# Patient Record
Sex: MALE | Race: WHITE | NOT HISPANIC OR LATINO | URBAN - METROPOLITAN AREA
[De-identification: names, ages, dates, MRNs, and addresses within clinical notes are randomized per-mention and may not be internally consistent; named-entity substitution may affect disease eponyms.]

---

## 2017-02-23 ENCOUNTER — ON CAMPUS - OUTPATIENT (AMBULATORY)
Dept: URBAN - METROPOLITAN AREA HOSPITAL 2 | Facility: HOSPITAL | Age: 62
End: 2017-02-23
Payer: COMMERCIAL

## 2017-02-23 ENCOUNTER — OFFICE (AMBULATORY)
Dept: URBAN - METROPOLITAN AREA CLINIC 64 | Facility: CLINIC | Age: 62
End: 2017-02-23
Payer: COMMERCIAL

## 2017-02-23 VITALS
OXYGEN SATURATION: 99 % | SYSTOLIC BLOOD PRESSURE: 104 MMHG | RESPIRATION RATE: 16 BRPM | SYSTOLIC BLOOD PRESSURE: 102 MMHG | SYSTOLIC BLOOD PRESSURE: 94 MMHG | HEART RATE: 56 BPM | HEART RATE: 67 BPM | HEART RATE: 69 BPM | WEIGHT: 175 LBS | HEART RATE: 70 BPM | DIASTOLIC BLOOD PRESSURE: 68 MMHG | SYSTOLIC BLOOD PRESSURE: 82 MMHG | DIASTOLIC BLOOD PRESSURE: 78 MMHG | OXYGEN SATURATION: 97 % | SYSTOLIC BLOOD PRESSURE: 111 MMHG | SYSTOLIC BLOOD PRESSURE: 105 MMHG | DIASTOLIC BLOOD PRESSURE: 75 MMHG | SYSTOLIC BLOOD PRESSURE: 106 MMHG | SYSTOLIC BLOOD PRESSURE: 96 MMHG | SYSTOLIC BLOOD PRESSURE: 81 MMHG | SYSTOLIC BLOOD PRESSURE: 83 MMHG | OXYGEN SATURATION: 98 % | TEMPERATURE: 97.6 F | DIASTOLIC BLOOD PRESSURE: 54 MMHG | RESPIRATION RATE: 18 BRPM | HEIGHT: 74 IN | HEART RATE: 71 BPM | DIASTOLIC BLOOD PRESSURE: 62 MMHG | DIASTOLIC BLOOD PRESSURE: 61 MMHG | HEART RATE: 58 BPM | HEART RATE: 68 BPM | DIASTOLIC BLOOD PRESSURE: 57 MMHG | DIASTOLIC BLOOD PRESSURE: 56 MMHG

## 2017-02-23 DIAGNOSIS — K63.5 POLYP OF COLON: ICD-10-CM

## 2017-02-23 DIAGNOSIS — K64.0 FIRST DEGREE HEMORRHOIDS: ICD-10-CM

## 2017-02-23 DIAGNOSIS — R19.7 DIARRHEA, UNSPECIFIED: ICD-10-CM

## 2017-02-23 DIAGNOSIS — K52.831 COLLAGENOUS COLITIS: ICD-10-CM

## 2017-02-23 DIAGNOSIS — K52.89 OTHER SPECIFIED NONINFECTIVE GASTROENTERITIS AND COLITIS: ICD-10-CM

## 2017-02-23 PROBLEM — D12.5 BENIGN NEOPLASM OF SIGMOID COLON: Status: ACTIVE | Noted: 2017-02-23

## 2017-02-23 LAB
GI HISTOLOGY: A. UNSPECIFIED: (no result)
GI HISTOLOGY: B. UNSPECIFIED: (no result)
GI HISTOLOGY: C. UNSPECIFIED: (no result)
GI HISTOLOGY: PDF REPORT: (no result)

## 2017-02-23 PROCEDURE — 88305 TISSUE EXAM BY PATHOLOGIST: CPT | Performed by: INTERNAL MEDICINE

## 2017-02-23 PROCEDURE — 45380 COLONOSCOPY AND BIOPSY: CPT | Performed by: INTERNAL MEDICINE

## 2017-02-23 RX ORDER — COLESTIPOL HYDROCHLORIDE 1 G/1
3 TABLET, FILM COATED ORAL
Qty: 90 | Refills: 11 | Status: COMPLETED
Start: 2016-06-30 | End: 2017-07-03

## 2017-02-23 RX ADMIN — PROPOFOL: 10 INJECTION, EMULSION INTRAVENOUS at 08:12

## 2017-07-03 ENCOUNTER — OFFICE (AMBULATORY)
Dept: URBAN - METROPOLITAN AREA CLINIC 64 | Facility: CLINIC | Age: 62
End: 2017-07-03

## 2017-07-03 VITALS
HEART RATE: 65 BPM | WEIGHT: 186 LBS | SYSTOLIC BLOOD PRESSURE: 110 MMHG | DIASTOLIC BLOOD PRESSURE: 69 MMHG | HEIGHT: 74 IN

## 2017-07-03 DIAGNOSIS — K52.831 COLLAGENOUS COLITIS: ICD-10-CM

## 2017-07-03 DIAGNOSIS — R19.7 DIARRHEA, UNSPECIFIED: ICD-10-CM

## 2017-07-03 PROCEDURE — 99213 OFFICE O/P EST LOW 20 MIN: CPT | Performed by: INTERNAL MEDICINE

## 2017-07-03 RX ORDER — IBUPROFEN 200 MG/1
CAPSULE, LIQUID FILLED ORAL; ORAL
Status: COMPLETED
End: 2017-07-03

## 2023-03-30 NOTE — PROGRESS NOTES
Subjective     Chief Complaint   Patient presents with   • Neck Pain     Neck pain         Previous Treatment: Physical therapy, chiropractic care, Norco 5 PRN, advil, muscle relaxers    HPI: Randy Grigsby is a 67 y.o. male with a pertinent PMH of previous rotator cuff tear who presents today c/o chronic neck pain for >10 years.  Symptoms have progressively worsened over time.  His pain is constant and worse on the left side where it goes into his shoulder.  Left lateral ROM provokes his pain.  Nothing in particular makes the pain better.  Pain does not radiate down either arm.  He does endorse intermittent left hand numbness/tingling into the third fourth and fifth digits.  He denies balance/coordination issues, difficulty with fine motor tasks, frequently dropping things, or any significant degree of weakness in his extremities.  He does report intermittent headaches associated with his pain that are typically felt on the left side.  He has no visual disturbance or speech difficulties.  He denies history of facial pain/numbness/tingling.    Patient has tried multiple sessions of physical therapy throughout the past several years without any significant relief.  He is currently seeing a chiropractor which helps his low back but not his neck.  He utilizes Norco 5 and Advil on an occasional basis.  He has previously gotten significant relief with a steroid pack.  He has tried trigger point injections with transient relief.  He has never tried facet injections/ablation or epidural steroid injections.        PMH:  Past Medical History:   Diagnosis Date   • Arthritis    • Cervical spinal stenosis    • Colon polyps    • GERD (gastroesophageal reflux disease)    • History of fracture of left ankle fracture    • History of rotator cuff tear    • Persistent headaches    • Psoriasis    • Tinnitus          Current Outpatient Medications:   •  BUDESONIDE ER PO, Take  by mouth., Disp: , Rfl:   •  clotrimazole (LOTRIMIN) 1 %  "cream, Apply 1 application topically to the appropriate area as directed 2 (Two) Times a Day., Disp: , Rfl:   •  HYDROcodone-acetaminophen (NORCO) 5-325 MG per tablet, Take 1 tablet by mouth Every 6 (Six) Hours As Needed., Disp: , Rfl:   •  IBUPROFEN PO, Take  by mouth., Disp: , Rfl:   •  indomethacin (INDOCIN) 50 MG capsule, Take 1 capsule by mouth 3 (Three) Times a Day As Needed., Disp: , Rfl:   •  pantoprazole (PROTONIX) 40 MG pack packet, Take  by mouth Every Morning Before Breakfast., Disp: , Rfl:   •  methylPREDNISolone (MEDROL) 4 MG dose pack, Take as directed on package instructions., Disp: 1 each, Rfl: 0     Allergies   Allergen Reactions   • Anectine [Succinylcholine Chloride] Other (See Comments)     intolerance   • Sulfa Antibiotics Other (See Comments)     Was told as a child by a parent        History reviewed. No pertinent surgical history.     History reviewed. No pertinent family history.      Social Hx:  Social History     Tobacco Use   Smoking Status Some Days   • Types: Cigars   • Passive exposure: Never   Smokeless Tobacco Never      Alcohol Use: Not on file      Social History     Substance and Sexual Activity   Drug Use Never          Review of Systems   Constitutional: Positive for activity change.   HENT: Negative.    Eyes: Negative.    Respiratory: Negative.    Cardiovascular: Negative.    Gastrointestinal: Negative.    Endocrine: Negative.    Genitourinary: Negative.    Musculoskeletal: Positive for arthralgias, myalgias and neck pain.        Left shoulder   Skin: Negative.    Allergic/Immunologic: Negative.    Neurological: Positive for numbness (tingling/hand).   Hematological: Negative.    Psychiatric/Behavioral: Positive for sleep disturbance.         Objective     BP 97/63   Pulse 76   Ht 188 cm (74\")   Wt 83.2 kg (183 lb 6.4 oz)   SpO2 98%   BMI 23.55 kg/m²    Body mass index is 23.55 kg/m².      Physical Exam  Vitals reviewed.   Constitutional:       General: He is not in acute " distress.     Appearance: Normal appearance. He is well-developed, well-groomed and normal weight.   HENT:      Head: Normocephalic and atraumatic.   Eyes:      Extraocular Movements: Extraocular movements intact.      Pupils: Pupils are equal, round, and reactive to light.   Neck:      Comments: Negative Spurling sign  Negative Lhermitte's sign  Pain elicited with lateral ROM to the left, with associated grimacing  Cardiovascular:      Rate and Rhythm: Normal rate and regular rhythm.      Pulses: Normal pulses.   Pulmonary:      Effort: Pulmonary effort is normal. No respiratory distress.   Musculoskeletal:         General: No swelling or tenderness. Normal range of motion.      Cervical back: Normal range of motion and neck supple. No rigidity. Pain with movement present. Normal range of motion.   Skin:     General: Skin is warm and dry.      Findings: No bruising or rash.   Neurological:      General: No focal deficit present.      Mental Status: He is alert and oriented to person, place, and time.      Sensory: Sensation is intact.      Motor: Motor function is intact.      Coordination: Coordination is intact.      Gait: Gait is intact.      Deep Tendon Reflexes:      Reflex Scores:       Tricep reflexes are 2+ on the right side and 2+ on the left side.       Bicep reflexes are 2+ on the right side and 2+ on the left side.       Brachioradialis reflexes are 2+ on the right side and 2+ on the left side.       Patellar reflexes are 2+ on the right side and 2+ on the left side.       Achilles reflexes are 2+ on the right side and 2+ on the left side.     Comments:             Neurological Exam  Mental Status  Alert. Oriented to person, place, and time.    Cranial Nerves  CN III, IV, VI: Extraocular movements intact bilaterally. Pupils equal round and reactive to light bilaterally.    Motor  Normal muscle bulk throughout. No fasciculations present. Normal muscle tone. Strength is 5/5 in all four extremities except  as noted.                                             Right                     Left  Deltoid                                   5                          5   Biceps                                   5                          5  Brachioradialis                      5                          5   Triceps                                  5                          5   Wrist flexor                            5                          5   Wrist extensor                       5                          5    Sensory  Normal sensation.Light touch is normal in upper and lower extremities. Pinprick is normal in upper and lower extremities.     Reflexes  Deep tendon reflexes are 2+ and symmetric except as noted.                                            Right                      Left  Brachioradialis                    2+                         2+  Biceps                                 2+                         2+  Triceps                                2+                         2+  Patellar                                2+                         2+  Achilles                                2+                         2+    Right pathological reflexes: Talia's absent. Ankle clonus present. 3+.  Left pathological reflexes: Talia's absent. Ankle clonus present. 3+.    Coordination    Finger-to-nose, rapid alternating movements and heel-to-shin normal bilaterally without dysmetria.    Gait   Normal gait.          Results Review  I personally reviewed and interpreted the images from the following studies:    MRI Cyberknife Cervical Spine Without Contrast  11/23/2020    MRI CERVICAL SPINE WITHOUT CONTRAST     TECHNIQUE:   Magnetic resonance imaging of the cervical spine was performed using   standard pulse sequences without contrast material.     HISTORY:   Neck pain left radicular pain     COMPARISONS:   None     FINDINGS:   Bones an alignment: Loss of normal lordotic curvature of the cervical   spine noted related  to degenerative change.  Minimal posterior   subluxation of C6 on C7 by 3 mm related facet arthropathy.  Modic   changes multiple levels.  No fractures or marrow replacing lesions.     Discs: Diffuse disc desiccation.  Mild narrowing of C4-5 and   mild-to-moderate narrowing of C5-6.  Moderate narrowing of C6-7.     Spinal canal: Mild spinal stenosis present C4-5 with the AP diameter   thecal sac midline measuring 8.5 mm.  Similar changes at C5-6.  This   due to moderate disc osteophyte complexes.  There is abutment of the   anterior cord at C4-5 and C5-6.  No significant mass effect or reactive   signal changes in the cord present.     Posterior elements: Moderate diffuse facet arthropathy.     Paraspinal soft tissues: No significant pathology.     Specific disc levels:     C2-3: No significant disc pathology.     C3-4: There is a mild-to-moderate disc osteophyte complex far   paracentral to the right with moderate right neural foramen compromise   and no significant thecal sac compression.     C4-5: Moderate to severe disc osteophyte complex most prominent far   paracentral to the right with severe right neural foramen compromise.   Mild anterior thecal sac compression with moderate left neural foramen   compromise.     C5-6: Moderate to severe disc osteophyte complex with moderate anterior   thecal sac compression and mild spinal stenosis.  There is severe   bilateral neural foramen compromise.     C6-7: Moderate to severe disc osteophyte complex with moderate to   severe bilateral neural foramen compromise.  Moderate anterior thecal   sac compression.     C7-T1: Mild disc osteophyte complex most prominent far paracentral to   left and to lesser extent on the right.    Impression    1: Moderate to severe degenerative disc disease most significant at see   4 5, C5-6 and C6-7.  Spinal stenosis most severe at C5-6 and variable   degrees of neural foramen compromise as discussed above.  At least   moderate facet  arthropathy diffusely contributing to neural foramen   compromise at several levels.        Assessment & Plan     MDM: Randy Grigsby is a 67 y.o. male being seen for chronic neck pain.  Patient has left-sided neck and shoulder pain without any radicular symptoms or subjective myelopathy.  He has some mild clonus on exam but is not floridly myelopathic and has no significant sensorimotor deficits.  His last MRI from 2020 shows degenerative disc disease most severe from C4-C7.  There is no evidence for any cord compression or cord signal change.  There are several levels of severe neuroforaminal stenosis, however he does not appear symptomatic from this at this time.  He does have a fair amount of facet arthropathy which I think is the primary culprit of his pain.  He has undergone several rounds of physical therapy without relief.  He has not tried facet injections/ablation or ADRIA.    We discussed several treatment options including repeating PT, medications such as a muscle relaxer or Celebrex, and injections with pain management.  We will hold off on starting any daily maintenance medications right now, but I did send in a Medrol Dosepak per patient request to help provide some acute relief of his pain.  I am referring patient to pain management with Dr. Howell for evaluation and possible facet injections vs ADRIA.  I am also ordering an x-ray cervical spine with flexion and extension to evaluate for any dynamic instability.  I do not think he needs an updated MRI at this time; however, if his pain persist despite the above measures an MRI may be warranted.  Patient may follow-up with me once he is able to try injections and we can evaluate his progress.  If he does want to consider medication such as Celebrex or muscle relaxer he may call myself or his PCP.  I discussed my assessment and recommendations with the patient who is agreeable to this plan.           Diagnosis Plan   1. Neck pain, chronic  XR spine  cervical ap and lat w flex and ext    methylPREDNISolone (MEDROL) 4 MG dose pack    Ambulatory Referral to Pain Management      2. Facet arthropathy, cervical  XR spine cervical ap and lat w flex and ext    methylPREDNISolone (MEDROL) 4 MG dose pack    Ambulatory Referral to Pain Management      3. DDD (degenerative disc disease), cervical  XR spine cervical ap and lat w flex and ext    Ambulatory Referral to Pain Management      4. Spinal stenosis in cervical region  XR spine cervical ap and lat w flex and ext    Ambulatory Referral to Pain Management          Return for progress check after injections with Pain Management.      Randy Grigsby  reports that he has been smoking cigars. He has never been exposed to tobacco smoke. He has never used smokeless tobacco.      BMI is within normal parameters. No other follow-up for BMI required.           This patient was examined wearing appropriate personal protective equipment.            Hola Mackey PA-C    03/31/23  12:33 EDT      Part of this note may be an electronic transcription/translation of spoken language to printed text using the Dragon Dictation System.

## 2023-03-31 ENCOUNTER — HOSPITAL ENCOUNTER (OUTPATIENT)
Dept: GENERAL RADIOLOGY | Facility: HOSPITAL | Age: 68
Discharge: HOME | End: 2023-03-31
Payer: MEDICARE

## 2023-03-31 ENCOUNTER — OFFICE VISIT (OUTPATIENT)
Dept: NEUROSURGERY | Facility: CLINIC | Age: 68
End: 2023-03-31
Payer: MEDICARE

## 2023-03-31 VITALS
BODY MASS INDEX: 23.54 KG/M2 | OXYGEN SATURATION: 98 % | HEART RATE: 76 BPM | WEIGHT: 183.4 LBS | HEIGHT: 74 IN | DIASTOLIC BLOOD PRESSURE: 63 MMHG | SYSTOLIC BLOOD PRESSURE: 97 MMHG

## 2023-03-31 DIAGNOSIS — M47.812 FACET ARTHROPATHY, CERVICAL: ICD-10-CM

## 2023-03-31 DIAGNOSIS — M50.30 DDD (DEGENERATIVE DISC DISEASE), CERVICAL: ICD-10-CM

## 2023-03-31 DIAGNOSIS — G89.29 NECK PAIN, CHRONIC: Primary | ICD-10-CM

## 2023-03-31 DIAGNOSIS — M54.2 NECK PAIN, CHRONIC: ICD-10-CM

## 2023-03-31 DIAGNOSIS — M48.02 SPINAL STENOSIS IN CERVICAL REGION: ICD-10-CM

## 2023-03-31 DIAGNOSIS — M54.2 NECK PAIN, CHRONIC: Primary | ICD-10-CM

## 2023-03-31 DIAGNOSIS — G89.29 NECK PAIN, CHRONIC: ICD-10-CM

## 2023-03-31 PROCEDURE — 72050 X-RAY EXAM NECK SPINE 4/5VWS: CPT

## 2023-03-31 RX ORDER — PANTOPRAZOLE SODIUM 40 MG/1
GRANULE, DELAYED RELEASE ORAL
COMMUNITY

## 2023-03-31 RX ORDER — METHYLPREDNISOLONE 4 MG/1
TABLET ORAL
Qty: 1 EACH | Refills: 0 | Status: SHIPPED | OUTPATIENT
Start: 2023-03-31

## 2023-03-31 RX ORDER — HYDROCODONE BITARTRATE AND ACETAMINOPHEN 7.5; 325 MG/1; MG/1
1 TABLET ORAL
COMMUNITY
Start: 2023-03-08 | End: 2023-03-31

## 2023-03-31 RX ORDER — INDOMETHACIN 50 MG/1
50 CAPSULE ORAL 3 TIMES DAILY PRN
COMMUNITY

## 2023-03-31 RX ORDER — CLOTRIMAZOLE 1 %
1 CREAM (GRAM) TOPICAL 2 TIMES DAILY
COMMUNITY

## 2023-03-31 RX ORDER — HYDROCODONE BITARTRATE AND ACETAMINOPHEN 5; 325 MG/1; MG/1
1 TABLET ORAL EVERY 6 HOURS PRN
COMMUNITY

## 2023-03-31 RX ORDER — SUCRALFATE 1 G/1
1 TABLET ORAL 4 TIMES DAILY
COMMUNITY
End: 2023-03-31

## 2023-04-10 ENCOUNTER — OFFICE VISIT (OUTPATIENT)
Dept: PAIN MEDICINE | Facility: CLINIC | Age: 68
End: 2023-04-10
Payer: MEDICARE

## 2023-04-10 ENCOUNTER — PATIENT ROUNDING (BHMG ONLY) (OUTPATIENT)
Dept: PAIN MEDICINE | Facility: CLINIC | Age: 68
End: 2023-04-10
Payer: MEDICARE

## 2023-04-10 VITALS
DIASTOLIC BLOOD PRESSURE: 53 MMHG | HEART RATE: 67 BPM | SYSTOLIC BLOOD PRESSURE: 87 MMHG | RESPIRATION RATE: 16 BRPM | OXYGEN SATURATION: 99 %

## 2023-04-10 DIAGNOSIS — M47.812 CERVICAL SPONDYLOSIS WITHOUT MYELOPATHY: ICD-10-CM

## 2023-04-10 DIAGNOSIS — M54.12 CERVICAL RADICULOPATHY: Primary | ICD-10-CM

## 2023-04-10 PROCEDURE — 99204 OFFICE O/P NEW MOD 45 MIN: CPT | Performed by: STUDENT IN AN ORGANIZED HEALTH CARE EDUCATION/TRAINING PROGRAM

## 2023-04-10 PROCEDURE — 1125F AMNT PAIN NOTED PAIN PRSNT: CPT | Performed by: STUDENT IN AN ORGANIZED HEALTH CARE EDUCATION/TRAINING PROGRAM

## 2023-04-10 RX ORDER — CYCLOBENZAPRINE HCL 10 MG
10 TABLET ORAL 3 TIMES DAILY
Qty: 90 TABLET | Refills: 0 | Status: SHIPPED | OUTPATIENT
Start: 2023-04-10

## 2023-04-10 RX ORDER — CELECOXIB 200 MG/1
200 CAPSULE ORAL DAILY
Qty: 30 CAPSULE | Refills: 0 | Status: SHIPPED | OUTPATIENT
Start: 2023-04-10

## 2023-04-10 NOTE — PROGRESS NOTES
April 10, 2023    Hello, may I speak with Randy Grigsby?    My name is Emma      I am  with [facility]  [address]  [place] [address].    Before we get started may I verify your date of birth? 1955    I am calling to officially welcome you to our practice and ask about your recent visit. Is this a good time to talk? yes    Tell me about your visit with us. What things went well?  Went well       We're always looking for ways to make our patients' experiences even better. Do you have recommendations on ways we may improve?  no    Overall were you satisfied with your first visit to our practice? yes       I appreciate you taking the time to speak with me today. Is there anything else I can do for you? no      Thank you, and have a great day.

## 2023-04-11 NOTE — PROGRESS NOTES
CHIEF COMPLAINT  Chief Complaint   Patient presents with   • Neck Pain     New patient referred for Chronic Neck pain Treated w/Hydrocodone last taken 10 days ago takes prn        Primary Care  Johnathon Ugarte MD    Subjective   Randy Grigsby is a 67 y.o. male  who presents for chronic neck pain and possible cervical radiculopathy.  He reports an extended history of chronic neck pain with intermittent radicular features.  His primary complaint today is more axial type neck pain however he does have some pain radiating into the left shoulder.  His wife also reports that intermittently, he would have electric-like sensations across the face and into the left scalp.  With chiropractic manipulation, these are somewhat improved.  He does continue to have spasms and pressure-like sensations in the left trapezius as well as down into the left deltoid and left tricep.  He denies any significant numbness or tingling in the hands bilaterally, but does have intermittent tingling across the first 3 fingers with certain wrist positions.  He denies any symptoms of myelopathy or lower extremity weakness.  He was previously evaluated by the neurosurgery physicians assistant who recommended conservative treatment prior to any further surgical evaluation work-up.  He reports that the pain is worse with certain range of motion's in his neck and somewhat improved with chiropractic and rest.    History of Present Illness     Location: Neck with radiation to the left upper extremity and left shoulder  Onset: Years ago  Duration: Fairly constant to slightly worsened  Timing: Intermittent throughout the day  Quality: Aching, cramping  Severity: Today: 2       Last Week: 2       Worst: 6  Modifying Factors: The pain is worse with certain range of motion's in the neck and the shoulder  Functional Deficit: The pain makes it difficult for him to perform his activities of daily living    Physical Therapy: yes    Interval Update  04/10/2023:     The following portions of the patient's history were reviewed and updated as appropriate: allergies, current medications, past family history, past medical history, past social history, past surgical history and problem list.      Current Outpatient Medications:   •  BUDESONIDE ER PO, Take  by mouth., Disp: , Rfl:   •  clotrimazole (LOTRIMIN) 1 % cream, Apply 1 application topically to the appropriate area as directed 2 (Two) Times a Day., Disp: , Rfl:   •  HYDROcodone-acetaminophen (NORCO) 5-325 MG per tablet, Take 1 tablet by mouth Every 6 (Six) Hours As Needed., Disp: , Rfl:   •  pantoprazole (PROTONIX) 40 MG pack packet, Take  by mouth Every Morning Before Breakfast., Disp: , Rfl:   •  celecoxib (CeleBREX) 200 MG capsule, Take 1 capsule by mouth Daily., Disp: 30 capsule, Rfl: 0  •  cyclobenzaprine (FLEXERIL) 10 MG tablet, Take 1 tablet by mouth 3 (Three) Times a Day., Disp: 90 tablet, Rfl: 0    Review of Systems   Musculoskeletal: Positive for myalgias, neck pain and neck stiffness. Negative for arthralgias, back pain and gait problem.   Neurological: Positive for numbness. Negative for weakness.       Vitals:    04/10/23 1020   BP: (!) 87/53   Pulse: 67   Resp: 16   SpO2: 99%   PainSc:   2         Objective   Physical Exam  Vitals and nursing note reviewed. Exam conducted with a chaperone present.   Constitutional:       General: He is not in acute distress.     Appearance: Normal appearance. He is normal weight.   Neck:      Comments: Cervical spine exam:  1.  Cervical paraspinal musculature tender to palpation  2.  Cervical range of motion decreased secondary to pain  3.  Cervical facets equivocal bilaterally  4.  Spurling negative bilaterally  Musculoskeletal:      Cervical back: No rigidity or crepitus. Pain with movement present. Decreased range of motion.   Neurological:      Mental Status: He is alert.           Assessment & Plan   Problems Addressed this Visit    None  Visit Diagnoses      Cervical radiculopathy    -  Primary    Relevant Orders    MRI Cervical Spine Without Contrast    Cervical spondylosis without myelopathy          Diagnoses       Codes Comments    Cervical radiculopathy    -  Primary ICD-10-CM: M54.12  ICD-9-CM: 723.4     Cervical spondylosis without myelopathy     ICD-10-CM: M47.812  ICD-9-CM: 721.0           Plan:  1. It is somewhat unclear whether he is primarily complaining of radiculopathy versus axial neck pain.  He does have plain films showing fairly significant degenerative change with osteophytes however he is also complaining of some pressure and tightness and spasms in the trapezius more suggestive of radiculopathy  2. Given that he does have a history of electric sensations across the face and scalp, we will go ahead and obtain a cervical MRI to check for stenosis causing nerve root compression  3. We will try celecoxib 200 mg daily for axial neck and facet arthropathy  4. Start cyclobenzaprine 10 mg 3 times daily as he is complaining of significant myofascial pain as well as trouble sleeping  5. I can see him back after his MRI.  We will decide whether he would be a better candidate for medial branch versus cervical epidural  --- Follow-up 1 month for MRI review           INSPECT REPORT    As part of the patient's treatment plan, I may be prescribing controlled substances. The patient has been made aware of appropriate use of such medications, including potential risk of somnolence, limited ability to drive and/or work safely, and the potential for dependence or overdose. It has also bee made clear that these medications are for use by this patient only, without concomitant use of alcohol or other substances unless prescribed.     Patient has completed prescribing agreement detailing terms of continued prescribing of controlled substances, including monitoring ELIZABETH reports, urine drug screening, and pill counts if necessary. The patient is aware that  inappropriate use will results in cessation of prescribing such medications.    INSPECT report has been reviewed and scanned into the patient's chart.    As the clinician, I personally reviewed the INSPECT from 4/7/2023.    History and physical exam exhibit continued safe and appropriate use of controlled substances.      EMR Dragon/Transcription disclaimer:   Much of this encounter note is an electronic transcription/translation of spoken language to printed text. The electronic translation of spoken language may permit erroneous, or at times, nonsensical words or phrases to be inadvertently transcribed; Although I have reviewed the note for such errors, some may still exist.

## 2023-05-01 ENCOUNTER — TELEPHONE (OUTPATIENT)
Dept: PAIN MEDICINE | Facility: CLINIC | Age: 68
End: 2023-05-01
Payer: MEDICARE

## 2023-05-01 NOTE — TELEPHONE ENCOUNTER
Caller: KRYSTLE AVILES    Relationship to patient: WIFE    Best call back number: [phone]    Chief complaint: MRI FOLLOW UP 05/16/2023    Type of visit: MRI FOLLOW UP 05/16/2023    Requested date: 05/16/2023    If rescheduling, when is the original appointment: NA    Additional notes: PATIENT WANTS TO BE SEEN ON THE SAME DAY AS HIS MRI.

## 2023-05-16 ENCOUNTER — HOSPITAL ENCOUNTER (OUTPATIENT)
Dept: MRI IMAGING | Facility: HOSPITAL | Age: 68
Discharge: HOME | End: 2023-05-16
Admitting: STUDENT IN AN ORGANIZED HEALTH CARE EDUCATION/TRAINING PROGRAM
Payer: MEDICARE

## 2023-05-16 DIAGNOSIS — M54.12 CERVICAL RADICULOPATHY: ICD-10-CM

## 2023-05-16 PROCEDURE — 72141 MRI NECK SPINE W/O DYE: CPT

## 2023-05-22 ENCOUNTER — OFFICE VISIT (OUTPATIENT)
Dept: PAIN MEDICINE | Facility: CLINIC | Age: 68
End: 2023-05-22
Payer: MEDICARE

## 2023-05-22 VITALS
OXYGEN SATURATION: 98 % | RESPIRATION RATE: 16 BRPM | DIASTOLIC BLOOD PRESSURE: 65 MMHG | SYSTOLIC BLOOD PRESSURE: 116 MMHG | HEART RATE: 73 BPM

## 2023-05-22 DIAGNOSIS — M47.812 CERVICAL SPONDYLOSIS WITHOUT MYELOPATHY: ICD-10-CM

## 2023-05-22 DIAGNOSIS — M54.12 CERVICAL RADICULOPATHY: Primary | ICD-10-CM

## 2023-05-22 RX ORDER — CELECOXIB 200 MG/1
200 CAPSULE ORAL DAILY
Qty: 90 CAPSULE | Refills: 0 | Status: SHIPPED | OUTPATIENT
Start: 2023-05-22

## 2023-05-22 RX ORDER — CYCLOBENZAPRINE HCL 10 MG
10 TABLET ORAL 3 TIMES DAILY
Qty: 90 TABLET | Refills: 1 | Status: SHIPPED | OUTPATIENT
Start: 2023-05-22

## 2023-05-22 NOTE — PROGRESS NOTES
CHIEF COMPLAINT  Chief Complaint   Patient presents with   • Neck Pain     1 month f/u w/MRI results  CC  Cervical radiculopathy  Treated w/Hydrocodone last dose a week ago takes prn        Primary Care  Johnathon Ugarte MD    Subjective   Randy Grigsby is a 67 y.o. male  who presents for chronic neck pain and possible cervical radiculopathy.  He reports an extended history of chronic neck pain with intermittent radicular features.  His primary complaint today is more axial type neck pain however he does have some pain radiating into the left shoulder.  His wife also reports that intermittently, he would have electric-like sensations across the face and into the left scalp.  With chiropractic manipulation, these are somewhat improved.  He does continue to have spasms and pressure-like sensations in the left trapezius as well as down into the left deltoid and left tricep.  He denies any significant numbness or tingling in the hands bilaterally, but does have intermittent tingling across the first 3 fingers with certain wrist positions.  He denies any symptoms of myelopathy or lower extremity weakness.  He was previously evaluated by the neurosurgery physicians assistant who recommended conservative treatment prior to any further surgical evaluation work-up.  He reports that the pain is worse with certain range of motion's in his neck and somewhat improved with chiropractic and rest.    Neck Pain   Associated symptoms include numbness. Pertinent negatives include no weakness.        Location: Neck with radiation to the left upper extremity and left shoulder  Onset: Years ago  Duration: Fairly constant to slightly worsened  Timing: Intermittent throughout the day  Quality: Aching, cramping  Severity: Today: 2       Last Week: 2       Worst: 6  Modifying Factors: The pain is worse with certain range of motion's in the neck and the shoulder  Functional Deficit: The pain makes it difficult for him to perform his  activities of daily living    Physical Therapy: yes    Interval Update 05/22/2023: Better benefit with a combination of Celebrex and chiropractic.  Still having some hand numbness and tingling.  I reviewed his MRI which does show several areas of cervical stenosis without evidence of cord edema    The following portions of the patient's history were reviewed and updated as appropriate: allergies, current medications, past family history, past medical history, past social history, past surgical history and problem list.      Current Outpatient Medications:   •  BUDESONIDE ER PO, Take  by mouth., Disp: , Rfl:   •  celecoxib (CeleBREX) 200 MG capsule, Take 1 capsule by mouth Daily., Disp: 90 capsule, Rfl: 0  •  clotrimazole (LOTRIMIN) 1 % cream, Apply 1 application topically to the appropriate area as directed 2 (Two) Times a Day., Disp: , Rfl:   •  cyclobenzaprine (FLEXERIL) 10 MG tablet, Take 1 tablet by mouth 3 (Three) Times a Day., Disp: 90 tablet, Rfl: 1  •  HYDROcodone-acetaminophen (NORCO) 5-325 MG per tablet, Take 1 tablet by mouth Every 6 (Six) Hours As Needed., Disp: , Rfl:   •  pantoprazole (PROTONIX) 40 MG pack packet, Take  by mouth Every Morning Before Breakfast., Disp: , Rfl:     Review of Systems   Musculoskeletal: Positive for myalgias, neck pain and neck stiffness. Negative for arthralgias, back pain and gait problem.   Neurological: Positive for numbness. Negative for weakness.       Vitals:    05/22/23 0816   BP: 116/65   Pulse: 73   Resp: 16   SpO2: 98%   PainSc:   2         Objective   Physical Exam  Vitals and nursing note reviewed. Exam conducted with a chaperone present.   Constitutional:       General: He is not in acute distress.     Appearance: Normal appearance. He is normal weight.   Neck:      Comments: Cervical spine exam:  1.  Cervical paraspinal musculature tender to palpation  2.  Cervical range of motion decreased secondary to pain  3.  Cervical facets equivocal bilaterally  4.   Spurling negative bilaterally  Musculoskeletal:      Cervical back: No rigidity or crepitus. Pain with movement present. Decreased range of motion.   Neurological:      Mental Status: He is alert.           Assessment & Plan   Problems Addressed this Visit    None  Visit Diagnoses     Cervical radiculopathy    -  Primary    Relevant Orders    Epidural Block    Cervical spondylosis without myelopathy          Diagnoses       Codes Comments    Cervical radiculopathy    -  Primary ICD-10-CM: M54.12  ICD-9-CM: 723.4     Cervical spondylosis without myelopathy     ICD-10-CM: M47.812  ICD-9-CM: 721.0           Plan:  1. Somewhat better with the Celebrex and cyclobenzaprine  2. Can try cervical epidural steroid injection as he continues with numbness and tingling in the hands intermittently  3. He can continue chiropractic  --- Follow-up next available for cervical epidural steroid injection           INSPECT REPORT    As part of the patient's treatment plan, I may be prescribing controlled substances. The patient has been made aware of appropriate use of such medications, including potential risk of somnolence, limited ability to drive and/or work safely, and the potential for dependence or overdose. It has also bee made clear that these medications are for use by this patient only, without concomitant use of alcohol or other substances unless prescribed.     Patient has completed prescribing agreement detailing terms of continued prescribing of controlled substances, including monitoring ELIZABETH reports, urine drug screening, and pill counts if necessary. The patient is aware that inappropriate use will results in cessation of prescribing such medications.    INSPECT report has been reviewed and scanned into the patient's chart.    As the clinician, I personally reviewed the INSPECT from 5/19/2023.    History and physical exam exhibit continued safe and appropriate use of controlled substances.      EMR  Dragon/Transcription disclaimer:   Much of this encounter note is an electronic transcription/translation of spoken language to printed text. The electronic translation of spoken language may permit erroneous, or at times, nonsensical words or phrases to be inadvertently transcribed; Although I have reviewed the note for such errors, some may still exist.

## 2023-06-07 ENCOUNTER — HOSPITAL ENCOUNTER (OUTPATIENT)
Dept: PAIN MEDICINE | Facility: HOSPITAL | Age: 68
Discharge: HOME | End: 2023-06-07
Payer: MEDICARE

## 2023-06-07 VITALS
RESPIRATION RATE: 16 BRPM | WEIGHT: 183 LBS | TEMPERATURE: 97.9 F | DIASTOLIC BLOOD PRESSURE: 78 MMHG | HEART RATE: 67 BPM | HEIGHT: 74 IN | OXYGEN SATURATION: 98 % | BODY MASS INDEX: 23.49 KG/M2 | SYSTOLIC BLOOD PRESSURE: 115 MMHG

## 2023-06-07 DIAGNOSIS — R52 PAIN: ICD-10-CM

## 2023-06-07 DIAGNOSIS — M54.12 CERVICAL RADICULOPATHY: Primary | ICD-10-CM

## 2023-06-07 DIAGNOSIS — M47.812 CERVICAL SPONDYLOSIS WITHOUT MYELOPATHY: ICD-10-CM

## 2023-06-07 PROCEDURE — 25510000001 IOPAMIDOL 41 % SOLUTION: Performed by: STUDENT IN AN ORGANIZED HEALTH CARE EDUCATION/TRAINING PROGRAM

## 2023-06-07 PROCEDURE — 62321 NJX INTERLAMINAR CRV/THRC: CPT | Performed by: STUDENT IN AN ORGANIZED HEALTH CARE EDUCATION/TRAINING PROGRAM

## 2023-06-07 PROCEDURE — 25010000002 METHYLPREDNISOLONE PER 40 MG: Performed by: STUDENT IN AN ORGANIZED HEALTH CARE EDUCATION/TRAINING PROGRAM

## 2023-06-07 PROCEDURE — 77003 FLUOROGUIDE FOR SPINE INJECT: CPT

## 2023-06-07 RX ORDER — METHYLPREDNISOLONE ACETATE 40 MG/ML
40 INJECTION, SUSPENSION INTRA-ARTICULAR; INTRALESIONAL; INTRAMUSCULAR; SOFT TISSUE ONCE
Status: COMPLETED | OUTPATIENT
Start: 2023-06-07 | End: 2023-06-07

## 2023-06-07 RX ORDER — BUPIVACAINE HYDROCHLORIDE 2.5 MG/ML
10 INJECTION, SOLUTION EPIDURAL; INFILTRATION; INTRACAUDAL ONCE
Status: COMPLETED | OUTPATIENT
Start: 2023-06-07 | End: 2023-06-07

## 2023-06-07 RX ORDER — IBUPROFEN 600 MG/1
1 TABLET ORAL 3 TIMES DAILY
COMMUNITY
Start: 2023-05-31

## 2023-06-07 RX ADMIN — BUPIVACAINE HYDROCHLORIDE 10 ML: 2.5 INJECTION, SOLUTION EPIDURAL; INFILTRATION; INTRACAUDAL; PERINEURAL at 10:57

## 2023-06-07 RX ADMIN — IOPAMIDOL 3 ML: 408 INJECTION, SOLUTION INTRATHECAL at 10:56

## 2023-06-07 RX ADMIN — METHYLPREDNISOLONE ACETATE 40 MG: 40 INJECTION, SUSPENSION INTRA-ARTICULAR; INTRALESIONAL; INTRAMUSCULAR; INTRASYNOVIAL; SOFT TISSUE at 10:57

## 2023-06-07 NOTE — PROCEDURES
Cervical Epidural Steroid Injection @ C7-T1  [facility]    PREOPERATIVE DIAGNOSIS:  Cervical Radiculopathy, Cervical Spinal Stenosis, and Cervical Disc Displacement  POSTOPERATIVE DIAGNOSIS:  Same as preop diagnosis    PROCEDURE:   Cervical Epidural Steroid Injection, Therapeutic Translaminar Injection, with epidurogram, at  C7-T1 level    PRE-PROCEDURE DISCUSSION WITH PATIENT:    Risks and complications were discussed with the patient prior to starting the procedure and informed consent was obtained.  We discussed various topics including but not limited to bleeding, infection, injury, paralysis, nerve injury, dural puncture, coma, death, worsening of clinical picture, lack of pain relief, and postprocedural soreness.    SURGEON:  Jatin Howell MD    REASON FOR PROCEDURE:   Degenerative changes are noted in the area., Stenotic area is noted, and is likely contributing to this chronic &/or recurrent pain. , and Radiating pattern of pain is likely consistent with degenerative changes in the area.    SEDATION:  Patient declined administration of moderate sedation    ANESTHETIC:  injectable LAs: Marcaine 0.25%  STEROID:   Methylprednisolone (DEPO MEDROL) 40mg/ml    DESCRIPTON OF PROCEDURE:  After obtaining informed consent, I.V. was started in the preop area.   The patient was taken to the operating room and placed in the prone position.  All pressure points were well padded.  The cervicothoracic spine area was prepped with Chloraprep and draped in a sterile fashion.  Under fluoroscopic guidance, the aforementioned interlaminar space was identified. Skin and subcutaneous tissues were anesthetized with 1% lidocaine in the middle of the space. A Tuohy needle was introduced through the skin and advanced to this interlaminar space and into the epidural space under fluoroscopic guidance and verified with loss-of-resistance technique to air.  After confirming the position of the needle with the fluoroscope with all  the views, and after aspiration was confirmed negative for blood and CSF, 1.5 mL of Omnipaque was injected.  After seeing appropriate epidurogram with lateral and PA views, a total of 4 cc solution was injected, consisting of 3cc of local anesthetic as above, with normal saline and injectable steroid as above.     ESTIMATED BLOOD LOSS:  <5 mL  SPECIMENS:  None    COMPLICATIONS:     No complications were noted., There was no indication of vascular uptake on live injection of contrast dye., There was no indication of intrathecal uptake on live injection of contrast dye., and There was not any evidence of dural puncture.      TOLERANCE & DISCHARGE CONDITION:    The patient tolerated the procedure well.  The patient was transported to the recovery area without difficulties.  The patient was discharged to home under the care of family in stable and satisfactory condition.    PLAN OF CARE:  The patient was given our standard instruction sheet.        2.   The patient will Return to clinic 6 wks.  3.    The patient will resume all medications as per the medication reconciliation sheet.

## 2023-06-07 NOTE — DISCHARGE INSTRUCTIONS

## 2023-06-08 ENCOUNTER — TELEPHONE (OUTPATIENT)
Dept: PAIN MEDICINE | Facility: HOSPITAL | Age: 68
End: 2023-06-08
Payer: MEDICARE

## 2023-06-08 NOTE — TELEPHONE ENCOUNTER
Attempted post procedure phone call but no answer.  Message left on answering machine to call us with any questions or concerns.

## 2023-07-24 ENCOUNTER — OFFICE VISIT (OUTPATIENT)
Dept: PAIN MEDICINE | Facility: CLINIC | Age: 68
End: 2023-07-24
Payer: MEDICARE

## 2023-07-24 VITALS
SYSTOLIC BLOOD PRESSURE: 101 MMHG | HEART RATE: 61 BPM | DIASTOLIC BLOOD PRESSURE: 61 MMHG | RESPIRATION RATE: 16 BRPM | OXYGEN SATURATION: 98 %

## 2023-07-24 DIAGNOSIS — M54.12 CERVICAL RADICULOPATHY: Primary | ICD-10-CM

## 2023-07-24 DIAGNOSIS — M47.812 CERVICAL SPONDYLOSIS WITHOUT MYELOPATHY: ICD-10-CM

## 2023-07-24 PROCEDURE — 99214 OFFICE O/P EST MOD 30 MIN: CPT | Performed by: STUDENT IN AN ORGANIZED HEALTH CARE EDUCATION/TRAINING PROGRAM

## 2023-07-24 PROCEDURE — 1125F AMNT PAIN NOTED PAIN PRSNT: CPT | Performed by: STUDENT IN AN ORGANIZED HEALTH CARE EDUCATION/TRAINING PROGRAM

## 2023-07-24 PROCEDURE — 1160F RVW MEDS BY RX/DR IN RCRD: CPT | Performed by: STUDENT IN AN ORGANIZED HEALTH CARE EDUCATION/TRAINING PROGRAM

## 2023-07-24 PROCEDURE — 1159F MED LIST DOCD IN RCRD: CPT | Performed by: STUDENT IN AN ORGANIZED HEALTH CARE EDUCATION/TRAINING PROGRAM

## 2023-07-24 RX ORDER — CYCLOBENZAPRINE HCL 10 MG
10 TABLET ORAL 3 TIMES DAILY
Qty: 90 TABLET | Refills: 1 | Status: SHIPPED | OUTPATIENT
Start: 2023-07-24

## 2023-07-24 RX ORDER — CELECOXIB 200 MG/1
200 CAPSULE ORAL DAILY
Qty: 90 CAPSULE | Refills: 0 | Status: SHIPPED | OUTPATIENT
Start: 2023-07-24

## 2023-07-24 RX ORDER — RISANKIZUMAB-RZAA 150 MG/ML
INJECTION SUBCUTANEOUS
COMMUNITY

## 2023-07-24 NOTE — PROGRESS NOTES
CHIEF COMPLAINT  Chief Complaint   Patient presents with    Neck Pain     6 wk f/u from Cervical epidural  CC  Neck Pain  Treated w/Hydrocodone LD 5 days ago takes prn        Primary Care  Johnathon Ugarte MD    Subjective   Randy Grigsby is a 68 y.o. male  who presents for chronic neck pain and possible cervical radiculopathy.  He reports an extended history of chronic neck pain with intermittent radicular features.  His primary complaint today is more axial type neck pain however he does have some pain radiating into the left shoulder.  His wife also reports that intermittently, he would have electric-like sensations across the face and into the left scalp.  With chiropractic manipulation, these are somewhat improved.  He does continue to have spasms and pressure-like sensations in the left trapezius as well as down into the left deltoid and left tricep.  He denies any significant numbness or tingling in the hands bilaterally, but does have intermittent tingling across the first 3 fingers with certain wrist positions.  He denies any symptoms of myelopathy or lower extremity weakness.  He was previously evaluated by the neurosurgery physicians assistant who recommended conservative treatment prior to any further surgical evaluation work-up.  He reports that the pain is worse with certain range of motion's in his neck and somewhat improved with chiropractic and rest.    Neck Pain   Associated symptoms include numbness. Pertinent negatives include no weakness.      Location: Neck with radiation to the left upper extremity and left shoulder  Onset: Years ago  Duration: Fairly constant to slightly worsened  Timing: Intermittent throughout the day  Quality: Aching, cramping  Severity: Today: 2       Last Week: 2       Worst: 6  Modifying Factors: The pain is worse with certain range of motion's in the neck and the shoulder  Functional Deficit: The pain makes it difficult for him to perform his activities of daily  living    Physical Therapy: yes    Interval Update 07/24/2023: He reports approximately 1 week of near 100% relief following his cervical epidural with return of symptoms as before.  He reports a significant decrease in the cramping sensation in his neck and shoulders as well as numbness and tingling for approximately 1 week    The following portions of the patient's history were reviewed and updated as appropriate: allergies, current medications, past family history, past medical history, past social history, past surgical history and problem list.    Procedures:  6/7/2023: Cervical epidural: 100% benefit for 1 week      Current Outpatient Medications:     BUDESONIDE ER PO, Take  by mouth., Disp: , Rfl:     celecoxib (CeleBREX) 200 MG capsule, Take 1 capsule by mouth Daily., Disp: 90 capsule, Rfl: 0    clotrimazole (LOTRIMIN) 1 % cream, Apply 1 application  topically to the appropriate area as directed 2 (Two) Times a Day., Disp: , Rfl:     cyclobenzaprine (FLEXERIL) 10 MG tablet, Take 1 tablet by mouth 3 (Three) Times a Day., Disp: 90 tablet, Rfl: 1    HYDROcodone-acetaminophen (NORCO) 5-325 MG per tablet, Take 1 tablet by mouth Every 6 (Six) Hours As Needed., Disp: , Rfl:     ibuprofen (ADVIL,MOTRIN) 600 MG tablet, Take 1 tablet by mouth 3 (Three) Times a Day., Disp: , Rfl:     pantoprazole (PROTONIX) 40 MG pack packet, Take  by mouth Every Morning Before Breakfast., Disp: , Rfl:     Risankizumab-rzaa (Skyrizi) 150 MG/ML solution prefilled syringe, Inject  under the skin into the appropriate area as directed., Disp: , Rfl:     Review of Systems   Musculoskeletal:  Positive for myalgias, neck pain and neck stiffness. Negative for arthralgias, back pain and gait problem.   Neurological:  Positive for numbness. Negative for weakness.     Vitals:    07/24/23 1029   BP: 101/61   BP Location: Right arm   Patient Position: Sitting   Cuff Size: Adult   Pulse: 61   Resp: 16   SpO2: 98%   PainSc:   2         Objective    Physical Exam  Vitals and nursing note reviewed. Exam conducted with a chaperone present.   Constitutional:       General: He is not in acute distress.     Appearance: Normal appearance. He is normal weight.   Neck:      Comments: Cervical spine exam:  1.  Cervical paraspinal musculature tender to palpation  2.  Cervical range of motion decreased secondary to pain  3.  Cervical facets equivocal bilaterally  4.  Spurling negative bilaterally  Musculoskeletal:      Cervical back: No rigidity or crepitus. Pain with movement present. Decreased range of motion.   Neurological:      Mental Status: He is alert.         Assessment & Plan   Problems Addressed this Visit    None  Visit Diagnoses       Cervical radiculopathy    -  Primary    Cervical spondylosis without myelopathy              Diagnoses         Codes Comments    Cervical radiculopathy    -  Primary ICD-10-CM: M54.12  ICD-9-CM: 723.4     Cervical spondylosis without myelopathy     ICD-10-CM: M47.812  ICD-9-CM: 721.0             Plan:  He reports complete resolution of his symptoms for approximately 1 week after the cervical epidural with return of symptoms as before  I suggest that he follow back up with the neurosurgery group as he did have fairly positive diagnostic benefit  We will refill his Flexeril and Celebrex  --- Follow-up 1 month           INSPECT REPORT    As part of the patient's treatment plan, I may be prescribing controlled substances. The patient has been made aware of appropriate use of such medications, including potential risk of somnolence, limited ability to drive and/or work safely, and the potential for dependence or overdose. It has also bee made clear that these medications are for use by this patient only, without concomitant use of alcohol or other substances unless prescribed.     Patient has completed prescribing agreement detailing terms of continued prescribing of controlled substances, including monitoring ELIZABETH reports, urine  drug screening, and pill counts if necessary. The patient is aware that inappropriate use will results in cessation of prescribing such medications.    INSPECT report has been reviewed and scanned into the patient's chart.    As the clinician, I personally reviewed the INSPECT from 7/21/2023.    History and physical exam exhibit continued safe and appropriate use of controlled substances.      EMR Dragon/Transcription disclaimer:   Much of this encounter note is an electronic transcription/translation of spoken language to printed text. The electronic translation of spoken language may permit erroneous, or at times, nonsensical words or phrases to be inadvertently transcribed; Although I have reviewed the note for such errors, some may still exist.